# Patient Record
(demographics unavailable — no encounter records)

---

## 2025-07-17 NOTE — HISTORY OF PRESENT ILLNESS
[FreeTextEntry1] :  Chief complaint: gerd flare, encounter for colon cancer screening   HPI:  Patient presents for Gastroenterology evaluation because of multiple complex Gastrointestinal issues.  Patient has a complex syndrome of abdominal pain, with intermittent crampy rlq and llq abdominal pain with a crampy visceral character; there are no exacerbating or ameliorating factors.  As part of this complex Gastrointestinal evaluation, I extensively reviewed prior medical records on the patient including laboratory reports, medical imaging reports, and subspecialty consultations. Patient with prior adenomatous polyps removed from colon.  Colonoscopy procedure to be scheduled . The risks, benefits  alternatives of procedure were reviewed with patient, who gives informed consent to proceed.  Upper gastrointestinal endoscopy to be scheduled. The risks, benefits, alternatives of procedure were reviewed with patient, who gives informed consent to proceed. gerd diet reviewed with patient New rx for proton pump inhibitors sent to pharmacy new rx for suflave sent to [facility] pharmacy Medications, allergies, and problem list were reviewed and reconciled.

## 2025-07-17 NOTE — PHYSICAL EXAM
